# Patient Record
Sex: FEMALE | Race: WHITE
[De-identification: names, ages, dates, MRNs, and addresses within clinical notes are randomized per-mention and may not be internally consistent; named-entity substitution may affect disease eponyms.]

---

## 2018-02-03 ENCOUNTER — HOSPITAL ENCOUNTER (EMERGENCY)
Dept: HOSPITAL 92 - ERS | Age: 28
Discharge: HOME | End: 2018-02-03
Payer: SELF-PAY

## 2018-02-03 DIAGNOSIS — Z3A.10: ICD-10-CM

## 2018-02-03 DIAGNOSIS — O99.351: Primary | ICD-10-CM

## 2018-02-03 DIAGNOSIS — O99.281: ICD-10-CM

## 2018-02-03 DIAGNOSIS — E03.9: ICD-10-CM

## 2018-02-03 DIAGNOSIS — G43.909: ICD-10-CM

## 2018-02-03 DIAGNOSIS — F17.210: ICD-10-CM

## 2018-02-03 DIAGNOSIS — O99.331: ICD-10-CM

## 2018-02-03 PROCEDURE — 96374 THER/PROPH/DIAG INJ IV PUSH: CPT

## 2018-02-03 PROCEDURE — 96361 HYDRATE IV INFUSION ADD-ON: CPT

## 2018-04-10 ENCOUNTER — HOSPITAL ENCOUNTER (OUTPATIENT)
Dept: HOSPITAL 92 - BICULT | Age: 28
Discharge: HOME | End: 2018-04-10
Attending: FAMILY MEDICINE
Payer: COMMERCIAL

## 2018-04-10 DIAGNOSIS — Z3A.20: ICD-10-CM

## 2018-04-10 DIAGNOSIS — Z34.82: Primary | ICD-10-CM

## 2018-04-10 PROCEDURE — 76805 OB US >/= 14 WKS SNGL FETUS: CPT

## 2018-08-28 ENCOUNTER — HOSPITAL ENCOUNTER (OUTPATIENT)
Dept: HOSPITAL 92 - L&D/OP | Age: 28
Discharge: HOME | End: 2018-08-28
Attending: FAMILY MEDICINE
Payer: COMMERCIAL

## 2018-08-28 VITALS — BODY MASS INDEX: 34.2 KG/M2

## 2018-08-28 DIAGNOSIS — O47.1: Primary | ICD-10-CM

## 2018-08-28 DIAGNOSIS — Z3A.40: ICD-10-CM

## 2018-08-28 PROCEDURE — 99283 EMERGENCY DEPT VISIT LOW MDM: CPT

## 2018-08-28 NOTE — PDOC.LDHP
Labor and Delivery H&P


Chief complaint: contractions


HPI: 





26 y/o  at 40w3d, patient of Dr. Tori Samson, presents with ctx since 

her appointment and stronger since 2pm.  Denies VB, LOF, or decreased FM.  Was /-2 in clinic.





ROS neg for HEENT, cv, pulm, gi, gu, neuro, psych, skin, musculoskeletal, or 

constitutional symptoms other than mentioned above.


OB History Details: 





3 prior SVDs


Current pregnancy complications: none


Past Medical History: 





None


Current medications: pre-lyndsay vitamins


Previous surgical history: none


Allergies/Adverse Reactions: 


 Allergies











Allergy/AdvReac Type Severity Reaction Status Date / Time


 


No Known Allergies Allergy   Unverified 13 22:04











Social history: none





- Physical Exam


Vital signs reviewed and normal: yes


General: NAD, resting


Lungs: nonlabored breathing


Abdomen: gravid


Extremeties: no edema


FHT: category 1 (140s, mod variability, + accels, no decels)


[place] contractions every: q 3-4 mins





- Vaginal Exam


cm dilated: 4


Effacement: 90%


Station: -2





- Assessment





26 y/o  at 40w3d with no e/o active labor.  Fetal status reassuring with 

reactive NST.





- Plan


-: 





D/c home with precautions.  Advised to keep all prenatal appointments and 

continue daily fetal activity counts.

## 2023-03-18 ENCOUNTER — HOSPITAL ENCOUNTER (EMERGENCY)
Dept: HOSPITAL 92 - CSHERS | Age: 33
Discharge: HOME | End: 2023-03-18
Payer: SELF-PAY

## 2023-03-18 DIAGNOSIS — J11.1: Primary | ICD-10-CM

## 2023-03-18 DIAGNOSIS — H66.90: ICD-10-CM

## 2023-03-18 DIAGNOSIS — F17.210: ICD-10-CM

## 2023-03-18 DIAGNOSIS — E03.9: ICD-10-CM

## 2023-03-18 PROCEDURE — 94640 AIRWAY INHALATION TREATMENT: CPT

## 2023-03-18 PROCEDURE — 94760 N-INVAS EAR/PLS OXIMETRY 1: CPT

## 2023-03-18 PROCEDURE — 71045 X-RAY EXAM CHEST 1 VIEW: CPT
